# Patient Record
Sex: MALE | Race: WHITE
[De-identification: names, ages, dates, MRNs, and addresses within clinical notes are randomized per-mention and may not be internally consistent; named-entity substitution may affect disease eponyms.]

---

## 2017-12-18 ENCOUNTER — HOSPITAL ENCOUNTER (INPATIENT)
Dept: HOSPITAL 74 - JER | Age: 72
LOS: 1 days | Discharge: HOME | DRG: 776 | End: 2017-12-19
Attending: FAMILY MEDICINE | Admitting: FAMILY MEDICINE
Payer: COMMERCIAL

## 2017-12-18 VITALS — BODY MASS INDEX: 31.5 KG/M2

## 2017-12-18 DIAGNOSIS — F13.230: Primary | ICD-10-CM

## 2017-12-18 DIAGNOSIS — G47.00: ICD-10-CM

## 2017-12-18 DIAGNOSIS — G20: ICD-10-CM

## 2017-12-18 DIAGNOSIS — I10: ICD-10-CM

## 2017-12-18 DIAGNOSIS — F41.9: ICD-10-CM

## 2017-12-18 LAB
ALBUMIN SERPL-MCNC: 3.7 G/DL (ref 3.4–5)
ALP SERPL-CCNC: 62 U/L (ref 45–117)
ALT SERPL-CCNC: 29 U/L (ref 12–78)
ANION GAP SERPL CALC-SCNC: 8 MMOL/L (ref 8–16)
APTT BLD: 32.5 SECONDS (ref 26.9–34.4)
AST SERPL-CCNC: 20 U/L (ref 15–37)
BASOPHILS # BLD: 0.5 % (ref 0–2)
BILIRUB SERPL-MCNC: 0.5 MG/DL (ref 0.2–1)
CALCIUM SERPL-MCNC: 8.4 MG/DL (ref 8.5–10.1)
CO2 SERPL-SCNC: 27 MMOL/L (ref 21–32)
CREAT SERPL-MCNC: 0.9 MG/DL (ref 0.7–1.3)
DEPRECATED RDW RBC AUTO: 13.5 % (ref 11.9–15.9)
EOSINOPHIL # BLD: 3.5 % (ref 0–4.5)
GLUCOSE SERPL-MCNC: 134 MG/DL (ref 74–106)
INR BLD: 1.05 (ref 0.82–1.09)
MCH RBC QN AUTO: 31 PG (ref 25.7–33.7)
MCHC RBC AUTO-ENTMCNC: 33.9 G/DL (ref 32–35.9)
MCV RBC: 91.4 FL (ref 80–96)
NEUTROPHILS # BLD: 43.5 % (ref 42.8–82.8)
PLATELET # BLD AUTO: 199 K/MM3 (ref 134–434)
PMV BLD: 7.4 FL (ref 7.5–11.1)
PROT SERPL-MCNC: 7.2 G/DL (ref 6.4–8.2)
PT PNL PPP: 11.9 SEC (ref 9.98–11.88)
WBC # BLD AUTO: 5.1 K/MM3 (ref 4–10)

## 2017-12-18 NOTE — PDOC
*Physical Exam





- Vital Signs


 Last Vital Signs











Temp Pulse Resp BP Pulse Ox


 


 97.4 F L  58 L  18   151/77   95 


 


 12/18/17 16:47  12/18/17 16:47  12/18/17 16:47  12/18/17 16:47  12/18/17 16:47














ED Treatment Course





- LABORATORY


CBC & Chemistry Diagram: 


 12/18/17 17:00





 12/18/17 17:00





- ADDITIONAL ORDERS


Additional order review: 


 Laboratory  Results











  12/18/17 12/18/17





  17:00 17:00


 


PT with INR   11.90 H


 


INR   1.05


 


PTT (Actin FS)   32.5


 


Sodium  141 


 


Potassium  3.7 


 


Chloride  106 


 


Carbon Dioxide  27 


 


Anion Gap  8 


 


BUN  11 


 


Creatinine  0.9 


 


Creat Clearance w eGFR  > 60 


 


Random Glucose  134 H 


 


Calcium  8.4 L 


 


Total Bilirubin  0.5 


 


AST  20 


 


ALT  29 


 


Alkaline Phosphatase  62 


 


Total Protein  7.2 


 


Albumin  3.7 








 











  12/18/17





  17:00


 


RBC  4.84


 


MCV  91.4


 


MCHC  33.9


 


RDW  13.5


 


MPV  7.4 L


 


Neutrophils %  43.5


 


Lymphocytes %  41.3 H


 


Monocytes %  11.2 H


 


Eosinophils %  3.5


 


Basophils %  0.5














- Medications


Given in the ED: 


ED Medications














Discontinued Medications














Generic Name Dose Route Start Last Admin





  Trade Name Sonuq  PRN Reason Stop Dose Admin


 


Diazepam  5 mg  12/18/17 20:09  12/18/17 20:32





  Valium -  PO  12/18/17 20:10  5 mg





  ONCE ONE   Administration


 


Memantine  10 mg  12/18/17 20:26  12/18/17 20:46





  Namenda -  PO  12/18/17 20:27  10 mg





  ONCE ONE   Administration


 


Risperidone  1 mg  12/18/17 20:26  12/18/17 20:46





  Risperdal -  PO  12/18/17 20:27  1 mg





  ONCE ONE   Administration














Medical Decision Making





- Medical Decision Making





12/18/17 22:20


I independently examined and evaluated this patient in conjunction with Dorie 

mid-level practitioner. I reviewed the case and agree with the mid-level 

practitioner's assessment, diagnosis and disposition.











*DC/Admit/Observation/Transfer


Diagnosis at time of Disposition: 


 Benzodiazepine withdrawal








- Referrals





- Patient Instructions





- Post Discharge Activity

## 2017-12-18 NOTE — PDOC
History of Present Illness





- General


Chief Complaint: Tremors


Stated Complaint: HEAD PAIN/TREMORS/NOT SLEEPING


Time Seen by Provider: 12/18/17 16:44





- History of Present Illness


Initial Comments: 





12/18/17 19:41


CHIEF COMPLAINT: tremor, insomnia





HISTORY OF PRESENT ILLNESS: 73 yo M with hx of anxiety, HTN, Parkinson's 

presents to ED with headache, tremors, and insomnia.  Patient's sons are at 

bedside and state that the patient recently came from [place] and had his 

medications changed by a new PCP.  Patient was previously taking risperidone, 

memantine, bromazepam, and atenolol, but per sons the doctor discontinued the 

bromazepam and atenolol.  Patient complains of headache "like a swelling" to 

the back of his head, and sons report that patient is "seeing things that aren'

t there."  Family reports that other than the visual hallucinations, patient 

has been acting normally and speaking normally. At baseline patient does not 

walk "due to knee problems, he uses crutches and has a wheelchair" per family. 





PAST MEDICAL HISTORY: as per HPI





FAMILY HISTORY: Denies





SOCIAL HISTORY: Denies tobacco, alcohol, illicit drug use. 





SURGICAL HISTORY: Denies





ALLERGIES: No known drug allergies





REVIEW OF SYSTEMS


General/Constitutional: Denies fever or chills. Denies weakness.





HEENT: Denies ear pain or discharge. Denies sore throat.





Cardiovascular: Denies chest pain or shortness of breath.





Respiratory: Denies cough, wheezing, or hemoptysis.





Gastrointestinal: Denies nausea, vomiting, diarrhea.





Genitourinary: Denies dysuria, frequency, or change in urination.





Musculoskeletal: Denies joint or muscle swelling or pain. Denies neck or back 

pain.





Skin and breasts: Denies rash or easy bruising.





Neurologic: Headache. Denies vertigo, loss of consciousness, or loss of 

sensation.





Psychiatric: Visual hallucinations, no auditory hallucinations. History of 

anxiety and insomnia.








PHYSICAL EXAM


General Appearance: Well-appearing, appropriately dressed.  No apparent 

distress.





HEENT: EOMI, PERRLA.  No conjunctival pallor.  No photophobia, scleral icterus.





Respiratory/Chest: Lungs CTAB.  No shortness of breath, chest tenderness, 

respiratory distress, accessory muscle use. No crackles, rales, rhonchi, stridor

, wheezing, dullness





Cardiovascular: RRR. S1, S2.  





Vascular Pulses: Dorsalis-Pedis (R): 2+, Dorsalis-Pedis (L): 2+





Gastrointestinal/Abdominal: Normal bowel sounds.  Abdomen soft, non-distended.  

No tenderness or rebound tenderness. No  organomegaly, pulsatile mass, guarding

, hernia, hepatomegaly, splenomegaly.





Musculoskeletal/Extremities:  Normal inspection. FROM of all extremities, 

normal capillary refill.  Pelvis Stable.  No CVA tenderness. No tenderness to 

extremities, pedal edema, swelling, erythema or deformity.





Integumentary: Appropriate color, dry, warm.  No cyanosis, erythema, jaundice 

or rash





Neurologic: CNs II-XII intact. Fully oriented, alert.  Appropriate mood/affect. 

Motor strength 5/5.  No appreciable EOM palsy, facial droop or sensory deficit. 














Past History





- Past Medical History


Allergies/Adverse Reactions: 


 Allergies











Allergy/AdvReac Type Severity Reaction Status Date / Time


 


No Known Allergies Allergy   Verified 12/18/17 16:51











Home Medications: 


Ambulatory Orders





Memantine HCl 10 mg PO BID 12/18/17 


Risperidone 1 mg PO BID 12/18/17 








Cardiac Disorders: Yes (low heart rate)


CVA: No


COPD: No


DVT: No


Other medical history: Alzheimers dis





- Surgical History


Abdominal Surgery: Yes (hernia repair)





- Suicide/Smoking/Psychosocial Hx


Smoking History: Never smoked


Information on smoking cessation initiated: No


Hx Alcohol Use: No


Drug/Substance Use Hx: No


Substance Use Type: None





*Physical Exam





- Vital Signs


 Last Vital Signs











Temp Pulse Resp BP Pulse Ox


 


 97.4 F L  58 L  18   151/77   95 


 


 12/18/17 16:47  12/18/17 16:47  12/18/17 16:47  12/18/17 16:47  12/18/17 16:47














ED Treatment Course





- LABORATORY


CBC & Chemistry Diagram: 


 12/18/17 17:00





 12/18/17 17:00





- ADDITIONAL ORDERS


Additional order review: 


 Laboratory  Results











  12/18/17 12/18/17





  17:00 17:00


 


PT with INR   11.90 H


 


INR   1.05


 


PTT (Actin FS)   32.5


 


Sodium  141 


 


Potassium  3.7 


 


Chloride  106 


 


Carbon Dioxide  27 


 


Anion Gap  8 


 


BUN  11 


 


Creatinine  0.9 


 


Creat Clearance w eGFR  > 60 


 


Random Glucose  134 H 


 


Calcium  8.4 L 


 


Total Bilirubin  0.5 


 


AST  20 


 


ALT  29 


 


Alkaline Phosphatase  62 


 


Total Protein  7.2 


 


Albumin  3.7 








 











  12/18/17





  17:00


 


RBC  4.84


 


MCV  91.4


 


MCHC  33.9


 


RDW  13.5


 


MPV  7.4 L


 


Neutrophils %  43.5


 


Lymphocytes %  41.3 H


 


Monocytes %  11.2 H


 


Eosinophils %  3.5


 


Basophils %  0.5














Medical Decision Making





- Medical Decision Making





12/18/17 20:08


73 yo M with hx of anxiety, HTN, Parkinson's presents to ED with headache, 

tremors, and insomnia s/p discontinuation of bromazepam. 





Discussed case with ER attending Danna . Will admit for observation overnight 

for withdrawal symptoms. 





-Valium 5 mg


-EKG


12/18/17 20:25


Discussed case with attending hospitalist MD Camacho, will order head CT. 





*DC/Admit/Observation/Transfer


Diagnosis at time of Disposition: 


Benzodiazepine withdrawal


Qualifiers:


 Complication of substance-induced condition: with unspecified complication 

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 

withdrawal, unspecified








- Discharge Dispostion


Admit: Yes





- Referrals





- Patient Instructions





- Post Discharge Activity

## 2017-12-18 NOTE — PDOC
Rapid Medical Evaluation


Time Seen by Provider: 12/18/17 16:44


Medical Evaluation: 





12/18/17 16:49


I have performed a brief in-person evaluation of this patient. 





This patient presents with a chief complaint of:


insomnia, tremors and headache.  States started namenda x 2 weeks '


and is also taking risperdone. Patient is hard of hearing 





Pertinent physical exam findings:


NAD


unlabored breathing


lungs clear bilaterally


heart s1s2


neuro: hard of hearing but follows simple commands 





I have ordered the following:


labs 


This patient will proceed to the ED for further evaluation


12/18/17 21:24








**Discharge Disposition





- Referrals


Referrals: 


STAFF,NOT ON [Primary Care Provider] - 





- Patient Instructions





- Post Discharge Activity

## 2017-12-19 VITALS — TEMPERATURE: 97.8 F | SYSTOLIC BLOOD PRESSURE: 142 MMHG | DIASTOLIC BLOOD PRESSURE: 79 MMHG

## 2017-12-19 VITALS — HEART RATE: 56 BPM

## 2017-12-19 LAB — URINE MARIJUANA THC: NEGATIVE NG/ML

## 2017-12-19 NOTE — DS
Physical Examination


Vital Signs: 


 Vital Signs











Temperature  97.8 F   12/19/17 09:28


 


Pulse Rate  56 L  12/19/17 09:28


 


Respiratory Rate  18   12/19/17 09:28


 


Blood Pressure  142/79   12/19/17 09:28


 


O2 Sat by Pulse Oximetry (%)  96   12/19/17 02:19











Constitutional: Yes: No Distress


Eyes: Yes: WNL


HENT: Yes: WNL


Neck: Yes: WNL


Cardiovascular: Yes: WNL


Respiratory: Yes: WNL


Gastrointestinal: Yes: WNL


Renal/: Yes: WNL


Musculoskeletal: Yes: Muscle Weakness


Extremities: Yes: WNL


Edema: No


Peripheral Pulses WNL: Yes


Integumentary: Yes: WNL


Wound/Incision: Yes: Clean/Dry


Neurological: Yes: Pre-Existing Deficit


...Motor Strength: LLE, RLE


Psychiatric: Yes: Other


Labs: 


 CBC, BMP





 12/18/17 17:00 





 12/18/17 17:00 











Discharge Summary


Reason For Visit: BENZODIAZEPINE WITHDRAWAL


Current Active Problems





Benzodiazepine withdrawal (Acute)


Parkinson disease (Acute)








Procedures: Principal: CT HEAD


Hospital Course: 





ADMITTED FOR OBSERVATION, TREATED WITH IVF, NEUROLOGY WORKUP, CAN F/U AS 

OUTPATIENT, WITHDRAWEL SYMPTOMS RESOLVED


Condition: Fair





- Instructions


Diet, Activity, Other Instructions: 


LOW SODIUM DIET


PT AND NEUROLOGY F/U AS OUTPATIENT


SEE PMD IN 1-2 DAYS





- Home Medications


Comprehensive Discharge Medication List: 


Ambulatory Orders





Memantine HCl 10 mg PO BID 12/18/17 


Risperidone 1 mg PO BID 12/18/17 


Acetaminophen [Tylenol .Regular Strength -] 650 mg PO Q6H PRN  tablet 12/19/17 


Memantine HCl [Namenda -] 10 mg PO BID #60 tablet 12/19/17 


Risperidone [Risperdal -] 1 mg PO BID #60 tablet 12/19/17

## 2017-12-19 NOTE — CONSULT
Consult - text type





- Consultation


Consultation Note: 





 Neurology





History of Present Illness


CHIEF COMPLAINT: tremor, insomnia





HISTORY OF PRESENT ILLNESS: 73 yo M with hx of anxiety, HTN, Parkinson's 

presents to ED with headache, tremors, and insomnia.  Patient's sons were at 

bedside and stated that the patient recently came from [place] and had his 

medications changed by a new PCP.  Patient was previously taking risperidone, 

memantine, bromazepam, and atenolol, but per sons the doctor discontinued the 

bromazepam and atenolol.  Patient complains of headache "like a swelling" to 

the back of his head, and sons report that patient is "seeing things that aren'

t there."  Family reports that other than the visual hallucinations, patient 

has been acting normally and speaking normally. In speaking with the patient he 

is able to tell me that he's in the hospital as well as the date. He is alert 

and interactive and appears to be at baseline. CT head completed and reviewed 

and did not show acute changes. 





PAST MEDICAL HISTORY: as per HPI





FAMILY HISTORY: Denies





SOCIAL HISTORY: Denies tobacco, alcohol, illicit drug use. 





SURGICAL HISTORY: Denies





ALLERGIES: No known drug allergies





REVIEW OF SYSTEMS


General/Constitutional: Denies fever or chills. Denies weakness.





HEENT: Denies ear pain or discharge. Denies sore throat.





Cardiovascular: Denies chest pain or shortness of breath.





Respiratory: Denies cough, wheezing, or hemoptysis.





Gastrointestinal: Denies nausea, vomiting, diarrhea.





Genitourinary: Denies dysuria, frequency, or change in urination.





Musculoskeletal: Denies joint or muscle swelling or pain. Denies neck or back 

pain.





Skin and breasts: Denies rash or easy bruising.





Neurologic: Headache. Denies vertigo, loss of consciousness, or loss of 

sensation.





Psychiatric: Visual hallucinations, no auditory hallucinations. History of 

anxiety and insomnia.














Past History





- Past Medical History


Allergies/Adverse Reactions: 


 Allergies











Allergy/AdvReac Type Severity Reaction Status Date / Time


 


No Known Allergies Allergy   Verified 12/18/17 16:51











Home Medications: 


Ambulatory Orders





Memantine HCl 10 mg PO BID 12/18/17 


Risperidone 1 mg PO BID 12/18/17 








Cardiac Disorders: Yes (low heart rate)


CVA: No


COPD: No


DVT: No


Other medical history: Alzheimers dis





- Surgical History


Abdominal Surgery: Yes (hernia repair)





- Suicide/Smoking/Psychosocial Hx


Smoking History: Never smoked


Information on smoking cessation initiated: No


Hx Alcohol Use: No


Drug/Substance Use Hx: No


Substance Use Type: None





*Physical Exam





 Vital Signs











Temperature  97.8 F   12/19/17 09:28


 


Pulse Rate  56 L  12/19/17 09:28


 


Respiratory Rate  18   12/19/17 09:28


 


Blood Pressure  142/79   12/19/17 09:28


 


O2 Sat by Pulse Oximetry (%)  96   12/19/17 02:19














PHYSICAL EXAM


General Appearance: Well-appearing, appropriately dressed.  No apparent 

distress.





HEENT: EOMI, PERRLA.  No conjunctival pallor.  No photophobia, scleral icterus.





Respiratory/Chest: Lungs CTAB.  No shortness of breath, chest tenderness, 

respiratory distress, accessory muscle use. No crackles, rales, rhonchi, stridor

, wheezing, dullness





Cardiovascular: RRR. S1, S2.  





Vascular Pulses: Dorsalis-Pedis (R): 2+, Dorsalis-Pedis (L): 2+





Gastrointestinal/Abdominal: Normal bowel sounds.  Abdomen soft, non-distended.  

No tenderness or rebound tenderness. No  organomegaly, pulsatile mass, guarding

, hernia, hepatomegaly, splenomegaly.





Musculoskeletal/Extremities:  Normal inspection. FROM of all extremities, 

normal capillary refill.  Pelvis Stable.  No CVA tenderness. No tenderness to 

extremities, pedal edema, swelling, erythema or deformity.





Integumentary: Appropriate color, dry, warm.  No cyanosis, erythema, jaundice 

or rash





Neurologic: CNs II-XII intact. Fully oriented, alert.  Appropriate mood/affect. 

Motor strength 5/5.  No appreciable EOM palsy, facial droop or sensory deficit. 











 Laboratory  Results











  12/18/17 12/18/17





  17:00 17:00


 


PT with INR   11.90 H


 


INR   1.05


 


PTT (Actin FS)   32.5


 


Sodium  141 


 


Potassium  3.7 


 


Chloride  106 


 


Carbon Dioxide  27 


 


Anion Gap  8 


 


BUN  11 


 


Creatinine  0.9 


 


Creat Clearance w eGFR  > 60 


 


Random Glucose  134 H 


 


Calcium  8.4 L 


 


Total Bilirubin  0.5 


 


AST  20 


 


ALT  29 


 


Alkaline Phosphatase  62 


 


Total Protein  7.2 


 


Albumin  3.7 








 











  12/18/17





  17:00


 


RBC  4.84


 


MCV  91.4


 


MCHC  33.9


 


RDW  13.5


 


MPV  7.4 L


 


Neutrophils %  43.5


 


Lymphocytes %  41.3 H


 


Monocytes %  11.2 H


 


Eosinophils %  3.5


 


Basophils %  0.5











CT head reviewed





Medical Decision Making


73 yo M with hx of anxiety, HTN, Parkinson's presents to ED with headache, 

tremors, and insomnia.  Patient's sons were at bedside and stated that the 

patient recently came from [place] and had his medications changed by a new PCP.

  Patient was previously taking risperidone, memantine, bromazepam, and atenolol

, but per sons the doctor discontinued the bromazepam and atenolol.  Patient 

complains of headache "like a swelling" to the back of his head, and sons 

report that patient is "seeing things that aren't there."  Family reports that 

other than the visual hallucinations, patient has been acting normally and 

speaking normally. In speaking with the patient he is able to tell me that he's 

in the hospital as well as the date. He is alert and interactive and appears to 

be at baseline. CT head completed and reviewed and did not show acute changes. 


Consider psych consult for anxiety and for hallucinations


Can continue memantine as they find it helpful though does seem to have 

baseline mental status knowing person, place, time


Can give tylenol for tension headache


Monitor blood pressure, maintain < 140/90


Can continue atenolol


Maintain hydration and adequate PO intake

## 2017-12-20 NOTE — EKG
Test Reason : 

Blood Pressure : ***/*** mmHG

Vent. Rate : 054 BPM     Atrial Rate : 054 BPM

   P-R Int : 158 ms          QRS Dur : 096 ms

    QT Int : 456 ms       P-R-T Axes : 032 001 035 degrees

   QTc Int : 432 ms

 

SINUS BRADYCARDIA WITH PREMATURE ATRIAL COMPLEXES

OTHERWISE NORMAL ECG

NO PREVIOUS ECGS AVAILABLE

Confirmed by JASWINDER MOBLEY, ESTEFANI ([id number]) on 12/20/2017 11:20:33 AM

 

Referred By:             Confirmed By:ESTEFANI SAN MD

## 2018-04-13 ENCOUNTER — HOSPITAL ENCOUNTER (INPATIENT)
Dept: HOSPITAL 74 - JER | Age: 73
LOS: 4 days | Discharge: HOME | DRG: 720 | End: 2018-04-17
Attending: FAMILY MEDICINE | Admitting: INTERNAL MEDICINE
Payer: COMMERCIAL

## 2018-04-13 VITALS — BODY MASS INDEX: 32 KG/M2

## 2018-04-13 DIAGNOSIS — R79.89: ICD-10-CM

## 2018-04-13 DIAGNOSIS — D72.819: ICD-10-CM

## 2018-04-13 DIAGNOSIS — F02.80: ICD-10-CM

## 2018-04-13 DIAGNOSIS — I10: ICD-10-CM

## 2018-04-13 DIAGNOSIS — G20: ICD-10-CM

## 2018-04-13 DIAGNOSIS — A41.9: Primary | ICD-10-CM

## 2018-04-13 DIAGNOSIS — J18.9: ICD-10-CM

## 2018-04-13 DIAGNOSIS — J98.11: ICD-10-CM

## 2018-04-13 DIAGNOSIS — R00.0: ICD-10-CM

## 2018-04-13 DIAGNOSIS — F41.9: ICD-10-CM

## 2018-04-13 PROCEDURE — G0378 HOSPITAL OBSERVATION PER HR: HCPCS

## 2018-04-14 LAB
ACANTHOCYTES BLD QL SMEAR: 0
ALBUMIN SERPL-MCNC: 4.1 G/DL (ref 3.4–5)
ALP SERPL-CCNC: 97 U/L (ref 45–117)
ALT SERPL-CCNC: 46 U/L (ref 12–78)
ANION GAP SERPL CALC-SCNC: 11 MMOL/L (ref 8–16)
ANISOCYTOSIS BLD QL: 0
APPEARANCE UR: CLEAR
AST SERPL-CCNC: 71 U/L (ref 15–37)
BASO STIPL BLD QL SMEAR: 0
BILIRUB SERPL-MCNC: 0.6 MG/DL (ref 0.2–1)
BILIRUB UR STRIP.AUTO-MCNC: NEGATIVE MG/DL
BUN SERPL-MCNC: 12 MG/DL (ref 7–18)
CALCIUM SERPL-MCNC: 9.2 MG/DL (ref 8.5–10.1)
CHLORIDE SERPL-SCNC: 102 MMOL/L (ref 98–107)
CO2 SERPL-SCNC: 24 MMOL/L (ref 21–32)
COLOR UR: (no result)
CREAT SERPL-MCNC: 1.1 MG/DL (ref 0.7–1.3)
DACRYOCYTES BLD QL SMEAR: 0
DEPRECATED RDW RBC AUTO: 13.9 % (ref 11.9–15.9)
DOHLE BOD BLD QL SMEAR: 0
GLUCOSE SERPL-MCNC: 145 MG/DL (ref 74–106)
HCT VFR BLD CALC: 42.8 % (ref 35.4–49)
HELMET CELLS BLD QL SMEAR: 0
HGB BLD-MCNC: 14.6 GM/DL (ref 11.7–16.9)
HOWELL-JOLLY BOD BLD QL SMEAR: 0
KETONES UR QL STRIP: NEGATIVE
LEUKOCYTE ESTERASE UR QL STRIP.AUTO: NEGATIVE
MACROCYTES BLD QL: 0
MCH RBC QN AUTO: 30.5 PG (ref 25.7–33.7)
MCHC RBC AUTO-ENTMCNC: 34.1 G/DL (ref 32–35.9)
MCV RBC: 89.4 FL (ref 80–96)
NITRITE UR QL STRIP: NEGATIVE
OVALOCYTES BLD QL SMEAR: 0
PH UR: 5 [PH] (ref 5–8)
PLATELET # BLD AUTO: 188 K/MM3 (ref 134–434)
PLATELET BLD QL SMEAR: NORMAL
PMV BLD: 7.3 FL (ref 7.5–11.1)
POTASSIUM SERPLBLD-SCNC: 4.2 MMOL/L (ref 3.5–5.1)
PROT SERPL-MCNC: 7.8 G/DL (ref 6.4–8.2)
PROT UR QL STRIP: NEGATIVE
PROT UR QL STRIP: NEGATIVE
RBC # BLD AUTO: 4.79 M/MM3 (ref 4–5.6)
RBC # UR STRIP: NEGATIVE /UL
ROULEAUX BLD QL SMEAR: 0
SICKLE CELLS BLD QL SMEAR: 0
SODIUM SERPL-SCNC: 137 MMOL/L (ref 136–145)
SP GR UR: 1.03 (ref 1–1.03)
TARGETS BLD QL SMEAR: 0
TOXIC GRANULES BLD QL SMEAR: 0
UROBILINOGEN UR STRIP-MCNC: NEGATIVE MG/DL (ref 0.2–1)
WBC # BLD AUTO: 5.2 K/MM3 (ref 4–10)

## 2018-04-14 RX ADMIN — RISPERIDONE SCH MG: 1 TABLET, FILM COATED ORAL at 21:18

## 2018-04-14 RX ADMIN — HEPARIN SODIUM SCH UNIT: 5000 INJECTION, SOLUTION INTRAVENOUS; SUBCUTANEOUS at 10:54

## 2018-04-14 RX ADMIN — RISPERIDONE SCH MG: 1 TABLET, FILM COATED ORAL at 10:54

## 2018-04-14 RX ADMIN — MEMANTINE SCH MG: 10 TABLET ORAL at 21:18

## 2018-04-14 RX ADMIN — MEMANTINE SCH MG: 10 TABLET ORAL at 10:54

## 2018-04-14 RX ADMIN — HEPARIN SODIUM SCH UNIT: 5000 INJECTION, SOLUTION INTRAVENOUS; SUBCUTANEOUS at 21:18

## 2018-04-14 NOTE — HP
CHIEF COMPLAINT: Fever, Chills





PCP: Dr. Stefany Dixon





HISTORY OF PRESENT ILLNESS:


This is a 72 y/o man with a PMH of Dementia, Parkinson's, Hypertension, 

Anxiety. Who presents to the ED with fever, chills, rigors x 4pm yesterday. 

Patient has Dementia, unable to obtain HPI. Patient's daughter was at bedside, 

 used. The daughter reports the patient was shaking uncontrollably 

and he felt hot.


The patient denies cough, dizziness, HA, SOB, CP, N/V/D, constipation, dysuria.





ER course was notable for:


(1) Sepsis criteria met: T 103, P 118, R 22


(2) Chest Xray image: KAREN Pneumonia


(3)





Recent Travel: None





PAST MEDICAL HISTORY:


See HPI





PAST SURGICAL HISTORY:


B/L knee replacements (1/2018)





Social History:


Smoking: Never


Alcohol:  None


Drugs:    None


Lives with daughter





Family History:


Non-contributory





Allergies





No Known Allergies Allergy (Verified 04/13/18 23:42)


 








HOME MEDICATIONS:


 Home Medications











 Medication  Instructions  Recorded


 


Memantine HCl [Namenda -] 10 mg PO BID #60 tablet 12/19/17


 


Risperidone [Risperdal -] 1 mg PO BID #60 tablet 12/19/17








REVIEW OF SYSTEMS


CONSTITUTIONAL: fever, chills, loss of appetite 


Absent:  diaphoresis, generalized weakness, malaise, weight change


HEENT: 


Absent:  rhinorrhea, nasal congestion, throat pain, throat swelling, difficulty 

swallowing, mouth swelling, ear pain, eye pain, visual changes


CARDIOVASCULAR: 


Absent: chest pain, syncope, palpitations, irregular heart rate, lightheadedness

, peripheral edema


RESPIRATORY: 


Absent: cough, shortness of breath, dyspnea with exertion, orthopnea, wheezing, 

stridor, hemoptysis


GASTROINTESTINAL:


Absent: abdominal pain, abdominal distension, nausea, vomiting, diarrhea, 

constipation, melena, hematochezia


GENITOURINARY: 


Absent: dysuria, frequency, urgency, hesitancy, hematuria, flank pain, genital 

pain


MUSCULOSKELETAL: 


Absent: myalgia, arthralgia, joint swelling, back pain, neck pain


SKIN: 


Absent: rash, itching, pallor


HEMATOLOGIC/IMMUNOLOGIC: 


Absent: easy bleeding, easy bruising, lymphadenopathy, frequent infections


ENDOCRINE:


Absent: unexplained weight gain, unexplained weight loss, heat intolerance, 

cold intolerance


NEUROLOGIC: 


Absent: headache, focal weakness or paresthesias, dizziness, unsteady gait, 

seizure, mental status changes, bladder or bowel incontinence


PSYCHIATRIC: 


Absent: anxiety, depression, suicidal or homicidal ideation, hallucinations.








PHYSICAL EXAMINATION


 Vital Signs - 24 hr











  04/13/18 04/14/18





  23:42 01:23


 


Temperature  103.0 F H


 


Pulse Rate 116 H 


 


Respiratory 22 





Rate  


 


Blood Pressure 118/66 


 


O2 Sat by Pulse 96 





Oximetry (%)  











GENERAL: Awake- at baseline, in no acute distress.


HEAD: Normal with no signs of trauma.


EYES: Pupils equal, round and reactive to light, extraocular movements intact, 

sclera anicteric, conjunctiva clear. No lid lag.


EARS, NOSE, THROAT: Ears normal, nares patent, oropharynx clear without 

exudates. Dry mucous membranes.


NECK: Normal range of motion, supple without lymphadenopathy, JVD, or masses.


LUNGS: Breath sounds diminished to L-base. No wheezes, and no crackles. No 

accessory muscle use.


HEART: Regular rate and rhythm, normal S1 and S2 without murmur, rub or gallop.


ABDOMEN: Soft, nontender, not distended, normoactive bowel sounds, no guarding, 

no rebound, no masses.  No hepatomegaly or  splenomegaly. 


MUSCULOSKELETAL: Normal range of motion at all joints. No bony deformities or 

tenderness. No CVA tenderness.


UPPER EXTREMITIES: 2+ pulses, warm, well-perfused. No cyanosis. No clubbing. No 

peripheral edema.


LOWER EXTREMITIES: 2+ pulses, warm, well-perfused. No calf tenderness. No 

peripheral edema. 


NEUROLOGICAL:  Cranial nerves II-XII intact. Slowed speech. Gait not observed.


PSYCHIATRIC: Cooperative. Good eye contact. Appropriate mood and affect.


SKIN: Warm, dry, normal turgor, no rashes or lesions noted, normal capillary 

refill. 





 Laboratory Results - last 24 hr











  04/14/18 04/14/18 04/14/18





  01:05 01:05 01:05


 


WBC  5.2  


 


RBC  4.79  


 


Hgb  14.6  


 


Hct  42.8  


 


MCV  89.4  


 


MCH  30.5  


 


MCHC  34.1  


 


RDW  13.9  


 


Plt Count  188  


 


MPV  7.3 L  


 


Neutrophils %  No Result Required.  


 


Neutrophils % (Manual)  68.7  


 


Band Neutrophils %  4.0  


 


Lymphocytes %  No Result Required.  


 


Lymphocytes % (Manual)  10.1  


 


Monocytes % (Manual)  10  


 


Eosinophils % (Manual)  0.0  


 


Basophils % (Manual)  1.0  


 


Myelocytes % (Man)  0  


 


Promyelocytes % (Man)  0  


 


Blast Cells % (Manual)  0  


 


Nucleated RBC %  0  


 


Metamyelocytes  0  


 


Hypochromia  0  


 


Toxic Granulation  0  


 


Dohle Bodies  0  


 


Platelet Estimate  Normal  


 


Polychromasia  0  


 


Poikilocytosis  0  


 


Basophilic Stippling  0  


 


Anisocytosis  0  


 


Microcytosis  0  


 


Macrocytosis  0  


 


Spherocytes  0  


 


Sickle Cells  0  


 


Target Cells  0  


 


Tear Drop Cells  0  


 


Ovalocytes  0  


 


Stomatocytes  0  


 


Helmet Cells  0  


 


Mckee-Fincastle Bodies  0  


 


Cabot Rings  0  


 


[place] Cells  0  


 


Acanthocytes (Spur)  0  


 


Rouleaux  0  


 


Fragmented RBCs  0  


 


Schistocytes  0  


 


D-Dimer   1962 H 


 


Sodium    137


 


Potassium    4.2


 


Chloride    102


 


Carbon Dioxide    24


 


Anion Gap    11


 


BUN    12


 


Creatinine    1.1  D


 


Creat Clearance w eGFR    > 60


 


Random Glucose    145 H


 


Lactic Acid   


 


Calcium    9.2


 


Total Bilirubin    0.6


 


AST    71 H D


 


ALT    46  D


 


Alkaline Phosphatase    97  D


 


Creatine Kinase   


 


Troponin I   


 


Total Protein    7.8


 


Albumin    4.1














  04/14/18 04/14/18





  01:05 04:30


 


WBC  


 


RBC  


 


Hgb  


 


Hct  


 


MCV  


 


MCH  


 


MCHC  


 


RDW  


 


Plt Count  


 


MPV  


 


Neutrophils %  


 


Neutrophils % (Manual)  


 


Band Neutrophils %  


 


Lymphocytes %  


 


Lymphocytes % (Manual)  


 


Monocytes % (Manual)  


 


Eosinophils % (Manual)  


 


Basophils % (Manual)  


 


Myelocytes % (Man)  


 


Promyelocytes % (Man)  


 


Blast Cells % (Manual)  


 


Nucleated RBC %  


 


Metamyelocytes  


 


Hypochromia  


 


Toxic Granulation  


 


Dohle Bodies  


 


Platelet Estimate  


 


Polychromasia  


 


Poikilocytosis  


 


Basophilic Stippling  


 


Anisocytosis  


 


Microcytosis  


 


Macrocytosis  


 


Spherocytes  


 


Sickle Cells  


 


Target Cells  


 


Tear Drop Cells  


 


Ovalocytes  


 


Stomatocytes  


 


Helmet Cells  


 


Mckee-Fincastle Bodies  


 


Cabot Rings  


 


Beatriz Cells  


 


Acanthocytes (Spur)  


 


Rouleaux  


 


Fragmented RBCs  


 


Schistocytes  


 


D-Dimer  


 


Sodium  


 


Potassium  


 


Chloride  


 


Carbon Dioxide  


 


Anion Gap  


 


BUN  


 


Creatinine  


 


Creat Clearance w eGFR  


 


Random Glucose  


 


Lactic Acid   1.6


 


Calcium  


 


Total Bilirubin  


 


AST  


 


ALT  


 


Alkaline Phosphatase  


 


Creatine Kinase  43 


 


Troponin I  < 0.02 


 


Total Protein  


 


Albumin  











ASSESSMENT/PLAN:


This is a 72 y/o man with a PMHx of: Dementia, Parkinson's, HTN, Anxiety. 

Placed in Observation for Sepsis secondary to Community Acquired Pneumonia.





Plan:


1. Sepsis- Likely secondary to CAP, CURB65 Score 1, Chest Xray image- KAREN 

Infiltrate, report pending, Blood Cultures-pending, Urine Legionella, no 

Leukocytosis, no Neutrophilia, LA-wnl, Monitor CBC, Monitor vitals. Azithromycin

, Ceftriaxone given in ED, will continue, Appreciate ID consult


2. Pneumonia- see above


3. Elevated D- dimer- Likely secondary to Sepsis vs Wells Score 1.5, PE less 

likely, P 118-72 post fluid bolus, Tylenol, would consider CTA if P remains 100s

, or complaint SOB. currently patient denies SOB, P 72.


4. Hypertension- stable, continue home med, maintain MAP> 60, monitor BP


5. Parkinson's- continue home meds, fall precautions


6. Psych: Dementia, Anxiety- continue home meds, fall precautions 


7. FEN- replete lytes prn, Low Na Diet


7. DVT ppx- SCDs, Consider ACs if LOS > 48 hrs





Code Status: Full Code, HCP- Sandhya Resendiz (daughter)





Problem List





- Problem


(1) Sepsis


Code(s): A41.9 - SEPSIS, UNSPECIFIED ORGANISM   


Qualifiers: 


   Sepsis type: sepsis due to unspecified organism   Qualified Code(s): A41.9 - 

Sepsis, unspecified organism   





(2) Dementia


Code(s): F03.90 - UNSPECIFIED DEMENTIA WITHOUT BEHAVIORAL DISTURBANCE   





(3) Parkinson disease


Code(s): G20 - PARKINSON'S DISEASE   





(4) DVT prophylaxis


Code(s): QJL9711 -    





Visit type





- Emergency Visit


Emergency Visit: Yes


ED Registration Date: 04/14/18


Care time: The patient presented to the Emergency Department on the above date 

and was hospitalized for further evaluation of their emergent condition.





- New Patient


This patient is new to me today: Yes


Date on this admission: 04/14/18





- Critical Care


Critical Care patient: No





Hospitalist Screening





- Colonoscopy Questionnaire


Colonoscopy Questionnaire: 





Colonoscopy Questionnaire








-   Patient:


50 - 75 years old and never had a screening colonoscopy: Unknown


History of colon or rectal polyps, or CA: Unknown


History of IBD, Crohn's disease or UC: Unknown


History of abdominal radiation therapy as a child: Unknown





-   Relative:


1 with colon or rectal CA, or polyps at age 60 or younger: Unknown


Colon or rectal CA diagnosed at age 45 or younger: Unknown


Multiple relatives with colon or rectal CA: Unknown





-   Outcome:


Screening Result: Negative Screen

## 2018-04-14 NOTE — PN
Progress Note (short form)





- Note


Progress Note: 





ID consult dictated





imp/reccd





CAP- LLL infiltrate


influenza screen negative


cultures sent


send legionella antigen


continue rocephin/sejal











Problem List





- Problems


(1) Pneumonia


Code(s): J18.9 - PNEUMONIA, UNSPECIFIED ORGANISM

## 2018-04-14 NOTE — PDOC
History of Present Illness





- General


Chief Complaint: Cold Symptoms


Stated Complaint: FEVER


Time Seen by Provider: 04/14/18 00:42





- History of Present Illness


Initial Comments: 





04/14/18 00:42


Mr. Anshu Beckford is a 74 yo male w/ pmh of anxiety, HTN, and Parkinson's who 

presents for evaluation of an episode of shaking, palpitations, and shortness 

of breath. Per family who is with him he is usually ambulatory and can walk on 

his own but has been weak since this began. He had previously been in his 

normal state of health until this episode started suddenly at 4pm this evening.





The patient denies chest pain, headache and dizziness. Denies fever, chills, 

nausea, vomit, diarrhea and constipation. Denies dysuria, frequency, urgency 

and hematuria. 





Allergies: NKDA





Past History





- Past Medical History


Allergies/Adverse Reactions: 


 Allergies











Allergy/AdvReac Type Severity Reaction Status Date / Time


 


No Known Allergies Allergy   Verified 04/13/18 23:42











Home Medications: 


Ambulatory Orders





Memantine HCl [Namenda -] 10 mg PO BID #60 tablet 12/19/17 


Risperidone [Risperdal -] 1 mg PO BID #60 tablet 12/19/17 








Cardiac Disorders: Yes (low heart rate)


CVA: No


COPD: No


DVT: No


Dementia: Yes


HTN: Yes





- Surgical History


Abdominal Surgery: Yes (hernia repair)





- Suicide/Smoking/Psychosocial Hx


Smoking History: Never smoked


Have you smoked in the past 12 months: No


Information on smoking cessation initiated: No


Hx Alcohol Use: No


Drug/Substance Use Hx: No


Substance Use Type: None





**Review of Systems





- Review of Systems


Comments:: 





04/14/18 00:42


GENERAL/CONSTITUTIONAL: +Generalized weakness. No fever or chills. 


HEAD, EYES, EARS, NOSE AND THROAT: No change in vision. No ear pain or 

discharge. No sore throat.


CARDIOVASCULAR: +Palpitations since 4pm. No chest pain or shortness of breath


RESPIRATORY: +Reported "gasping for air" per family earlier today. No cough, 

wheezing, or hemoptysis.


GASTROINTESTINAL: No nausea, vomiting, diarrhea or constipation.


GENITOURINARY: No dysuria, frequency, or change in urination.


MUSCULOSKELETAL: No joint or muscle swelling or pain. No neck or back pain.


SKIN: No rash


NEUROLOGIC: No headache, vertigo, loss of consciousness, or change in strength/

sensation.


ENDOCRINE: No increased thirst. No abnormal weight change


HEMATOLOGIC/LYMPHATIC: No anemia, easy bleeding, or history of blood clots.


ALLERGIC/IMMUNOLOGIC: No hives or skin allergy.





*Physical Exam





- Vital Signs


 Last Vital Signs











Temp Pulse Resp BP Pulse Ox


 


    116 H  22   118/66   96 


 


    04/13/18 23:42  04/13/18 23:42  04/13/18 23:42  04/13/18 23:42














- Physical Exam


Comments: 





04/14/18 00:43


GENERAL: Awake, alert, and oriented to baseline, in no acute distress


HEAD: No signs of trauma, normocephalic, atraumatic 


EYES: PERRLA, EOMI, sclera anicteric, conjunctiva clear


ENT: Auricles normal inspection, hearing grossly normal, nares patent, 

oropharynx clear without


exudates. Moist mucosa


NECK: Normal ROM, supple, no lymphadenopathy, JVD, or masses


LUNGS: No distress, speaks full sentences, clear to auscultation bilaterally 


HEART: Regular rate and rhythm, normal S1 and S2, no murmurs, rubs or gallops, 

peripheral pulses normal and equal bilaterally. 


ABDOMEN: Soft, nontender, normoactive bowel sounds. No guarding, no rebound. No 

masses


EXTREMITIES: Normal inspection, Normal range of motion, no edema. No clubbing 

or cyanosis. 


NEUROLOGICAL: Cranial nerves II through XII grossly intact. Normal speech, 

normal gait, no focal sensorimotor deficits 


SKIN: Warm, Dry, normal turgor, no rashes or lesions noted. 





ED Treatment Course





- LABORATORY


CBC & Chemistry Diagram: 


 04/14/18 01:05





 04/14/18 01:05





Medical Decision Making





- Medical Decision Making





04/14/18 03:51


Mr. Brasher is a 74 yo male w/ pmh as described who presents for evaluation of 

new onset SOB. CBC/CMP/Blood Cx/Urine Cx/UA/EKG/CXR sent for evaluation.





04/14/18 03:55


Patient noted to have Left lower lobe pneumonia on CXR. Treating with 

azithromycin and ceftriaxone. Admitting for sepsis in the setting of pneumonia. 





*DC/Admit/Observation/Transfer


Diagnosis at time of Disposition: 


Sepsis


Qualifiers:


 Sepsis type: sepsis due to unspecified organism Qualified Code(s): A41.9 - 

Sepsis, unspecified organism








- Discharge Dispostion


Admit: Yes





- Referrals


Referrals: 


Stefany Dixon [Primary Care Provider] - 





- Patient Instructions





- Post Discharge Activity

## 2018-04-14 NOTE — PDOC
Attending Attestation





- Resident


Resident Name: Malcolm Appiah





- ED Attending Attestation


I have performed the following: I have examined & evaluated the patient, The 

case was reviewed & discussed with the resident, I agree w/resident's findings 

& plan, Exceptions are as noted





- HPI


HPI: 





04/14/18 01:14


73 M with h/o dementia, HTN, anxiety presents to ED with fevers, chills, and 

rigors. Pt's family states that he began to feel feverish at around 4PM. They 

did not measure a temp but state he felt warm. He subsequently began to rigor 

and have chills. Pt denies cough, denies N/V/D/abdominal pain. Denies dysuria. 

Denies flank pain.





- Physicial Exam


PE: 





04/14/18 01:15


"GENERAL: Awake, alert, and fully oriented, in no acute distress.


HEAD: No signs of trauma


EYES: PERRLA, EOMI, sclera anicteric, conjunctiva clear


ENT: Auricles normal inspection, hearing grossly normal, nares patent, 

oropharynx clear without exudates. Moist mucosa


NECK: Nontender, no stepoffs, Normal ROM, supple, no lymphadenopathy, JVD, or 

masses


LUNGS: Breath sounds equal, clear to auscultation bilaterally.  No wheezes, and 

no crackles


HEART: Regular rate and rhythm, normal S1 and S2, no murmurs, rubs or gallops


ABDOMEN: Soft, nontender, normoactive bowel sounds.  No guarding, no rebound.  

No masses


EXTREMITIES: Normal range of motion, no edema.  No clubbing or cyanosis. No 

cords, erythema, or tenderness


NEUROLOGICAL: Cranial nerves II through XII intact. 5/5 strength and sensation 

in all extremities, Normal speech, normal gait, normal cerebellar function


SKIN: Warm, Dry, normal turgor, no rashes or lesions noted.


"





- Medical Decision Making





04/14/18 01:15


73 M with fevers, chills, rigors x 1 day. Vitals notable for fever and 

tachycardia. Pt with no focal signs of infectious process at this time.


- Labs, cultures


- CXR, UA


- Flu swab


- IVF, tylenol





04/14/18 03:57


Labs wnl.


Prelim CXR read shows LLL PNA.


Pt covered for CAP with ceftriaxone + azithro


Will admit to obs

## 2018-04-14 NOTE — PN
Progress Note, Physician





- Current Medication List


Current Medications: 


Active Medications





Acetaminophen (Tylenol -)  650 mg PO Q6H PRN


   PRN Reason: PAIN OR FEVER


Heparin Sodium (Porcine) (Heparin -)  5,000 unit SQ BID [facility]


   Last Admin: 04/14/18 10:54 Dose:  5,000 unit


Azithromycin 500 mg/ Dextrose  250 mls @ 250 mls/hr IVPB DAILY [facility]


Ceftriaxone Sodium 1 gm/ (Dextrose)  50 mls @ 100 mls/hr IVPB DAILY [facility]


Memantine (Namenda -)  10 mg PO BID [facility]


   Last Admin: 04/14/18 10:54 Dose:  10 mg


Risperidone (Risperdal -)  1 mg PO BID [facility]


   Last Admin: 04/14/18 10:54 Dose:  1 mg











- Objective


Vital Signs: 


 Vital Signs











Temperature  99.3 F   04/14/18 10:00


 


Pulse Rate  73   04/14/18 08:03


 


Respiratory Rate  20   04/14/18 08:03


 


Blood Pressure  115/63   04/14/18 08:03


 


O2 Sat by Pulse Oximetry (%)  95   04/14/18 08:03











Labs: 


 CBC, BMP





 04/14/18 01:05 





 04/14/18 01:05 











Problem List





- Problems


(1) Sepsis


Assessment/Plan: 


- Likely secondary to CAP, CURB65 Score 1, Chest Xray image- KAREN Infiltrate, 

report pending, Blood Cultures-pending, Urine Legionella, no Leukocytosis, no 

Neutrophilia, LA-wnl, Monitor CBC, Monitor vitals. Azithromycin, Ceftriaxone 

will continue, 


-Appreciate ID consult





Code(s): A41.9 - SEPSIS, UNSPECIFIED ORGANISM   


Qualifiers: 


   Sepsis type: sepsis due to unspecified organism   Qualified Code(s): A41.9 - 

Sepsis, unspecified organism   





(2) Pneumonia


Assessment/Plan: 


as above


Code(s): J18.9 - PNEUMONIA, UNSPECIFIED ORGANISM   





(3) Dementia


Assessment/Plan: 





6. Psych: Dementia, Anxiety- continue home meds, fall precautions 


7. FEN- replete lytes prn, Low Na Diet


7. DVT ppx- SCDs, Consider ACs if LOS > 48 hrs





Code Status: Full Code, HCP- Sandhya Resendiz (daughter)





Code(s): F03.90 - UNSPECIFIED DEMENTIA WITHOUT BEHAVIORAL DISTURBANCE   





(4) Parkinson disease


Assessment/Plan: 





- continue home meds, fall precautions


Code(s): G20 - PARKINSON'S DISEASE   





(5) Elevated d-dimer


Assessment/Plan: 


- Likely secondary to Sepsis vs Wells Score 1.5, PE less likely, P 118-72 post 

fluid bolus, Tylenol, would consider CTA if P remains 100s, or complaint SOB. 

currently patient denies SOB, P 72.


-Pulm Consult


Code(s): R79.89 - OTHER SPECIFIED ABNORMAL FINDINGS OF BLOOD CHEMISTRY

## 2018-04-14 NOTE — CON.PULM
Consult


Consult Specialty:: PULM/CCM 


Referred by:: DAYNE


Reason for Consultation:: elevated D-dimer





- History of Present Illness


Chief Complaint: fever


History of Present Illness: 


73 M, anxiety, HTN, dementia, and Parkinson's.  Admitted via the ER due to 

chills, shaking, and shortness of breath.  Per the record, the family reports 

ta he has been weak and up until a day or 2 ago. 





No apparent travel history. 





No reported sick contacts.





No reported hemoptysis. 





CXR: LLL infiltrate/atelectasis 











- History Source


History Provided By: Medical Record


Limitations to Obtaining History: Dementia





- Alcohol/Substance Use


Hx Alcohol Use: No





- Smoking History


Smoking history: Never smoked


Have you smoked in the past 12 months: No





Home Medications





- Allergies


Allergies/Adverse Reactions: 


 Allergies











Allergy/AdvReac Type Severity Reaction Status Date / Time


 


No Known Allergies Allergy   Verified 04/13/18 23:42














- Home Medications


Home Medications: 


Ambulatory Orders





Memantine HCl [Namenda -] 10 mg PO BID #60 tablet 12/19/17 


Risperidone [Risperdal -] 1 mg PO BID #60 tablet 12/19/17 











Review of Systems


Unable to obtain ROS, reason: not able to provide 





Physical Exam


Vital Sings: 


 Vital Signs











Temperature  99.3 F   04/14/18 10:00


 


Pulse Rate  73   04/14/18 08:03


 


Respiratory Rate  20   04/14/18 08:03


 


Blood Pressure  115/63   04/14/18 08:03


 


O2 Sat by Pulse Oximetry (%)  95   04/14/18 08:03











Constitutional: Yes: No Distress, Calm


Eyes: Yes: Conjunctiva Clear, EOM Intact


HENT: Yes: Atraumatic, Normocephalic, Other


Neck: Yes: Supple


Cardiovascular: Yes: Regular Rate and Rhythm


Respiratory: Yes: Cough, Diminished, Rhonchi.  No: Accessory Muscle Use, On 

Nasal O2, Rales, SOB, SOB on Exertion, Stridor, Tachypnea, Wheezes


...Inspection: Yes: WNL


...Clubbing: No


Gastrointestinal: Yes: Normal Bowel Sounds, Soft


Renal/: Yes: WNL


Breast(s): Yes: WNL


Musculoskeletal: Yes: WNL


Edema: No


Peripheral Pulses WNL: Yes


Integumentary: Yes: WNL


Neurological: Yes: Confusion


...Motor Strength: WNL


Psychiatric: Yes: Alert


Labs: 


 CBC, BMP





 04/14/18 01:05 





 04/14/18 01:05 











Imaging





- Results


Chest X-ray: Report Reviewed, Image Reviewed





Problem List





- Problems


(1) Dementia


Code(s): F03.90 - UNSPECIFIED DEMENTIA WITHOUT BEHAVIORAL DISTURBANCE   





(2) Elevated d-dimer


Code(s): R79.89 - OTHER SPECIFIED ABNORMAL FINDINGS OF BLOOD CHEMISTRY   





(3) Pneumonia


Code(s): J18.9 - PNEUMONIA, UNSPECIFIED ORGANISM   





(4) Sepsis


Code(s): A41.9 - SEPSIS, UNSPECIFIED ORGANISM   


Qualifiers: 


   Sepsis type: sepsis due to unspecified organism   Qualified Code(s): A41.9 - 

Sepsis, unspecified organism   





(5) Parkinson disease


Code(s): G20 - PARKINSON'S DISEASE   





Assessment/Plan


Elevated D-Dimer due to Sepsis due to LLL CAP 


Wells score 1, Diagnosis of VTE highly unlikely.  Patient is not tachycardic, 

he is comfortable on RA and not hypoxic.  Will not pursue further workup.


Follow cultures 


Agree with ABX coverage 


VTE prophylaxis 


Will follow 





Dr Guzman

## 2018-04-14 NOTE — CONS
INFECTIOUS DISEASE CONSULTATION

 

DATE OF CONSULTATION:  04/14/2018

 

This is a 73-year-old man.  He lives in the community.  He had sudden onset of

chills, shortness of breath.  He reports cough as well that started at 4 p.m.

yesterday.  After that, he began feeling very weak and unable to walk on his own. 

The family brought him to the emergency room.  There is no chest pain, headache, or

dizziness.  He had no vomiting.  There has been no diarrhea or dysuria.

 

ALLERGIES:  He has no known drug allergies.

 

PAST MEDICAL HISTORY:  Notable for mild dementia, anxiety, hypertension, and

Parkinson disease.

 

SURGICAL HISTORY:  Notable for hernia repair.  He has had bilateral knee replacement.

 

MEDICATIONS AT HOME:  Include Namenda and Risperdal.

 

FAMILY HISTORY:  Noncontributory.

 

SOCIAL HISTORY:  There is no history of any cigarette or substance use.  He lives in

the community.  He lives with his daughter.

 

REVIEW OF SYSTEMS:  As per HPI.  There has been no sore throat.  He has just started

having some cough, and there is no diarrhea or dysuria.

 

PHYSICAL EXAMINATION:

Vital Signs:  Current temperature is 100.3.  T-max was 103 on arrival in the

emergency room, with a pulse of 116, blood pressure 118/66, respiratory rate is 22,

saturating 96% on room air.

HEENT:  He is normocephalic.  His eyes are anicteric.  He has no thrush or

pharyngitis.

Neck:  Supple.  There are no meningeal signs.

Lungs:  There are a few crackles, diminished breath sounds at the left base.

Heart:  Regular rate and rhythm.

Abdomen:  Soft, nontender.  He has no CVA or suprapubic tenderness.

Extremities:  Without edema.

 

DIAGNOSTIC DATA:  White count is 5.2, hemoglobin 14.2, platelets are 188.  His BUN is

12 and creatinine 1.1 with an AST of 71, CK of 43.  Lactic acid was 1.3.  Urinalysis

is negative.  Influenza screen was done and is negative.

 

Chest x-ray reveals a left lower lobe infiltrate.

 

In summary, this is a 73-year-old man admitted with sudden onset of fever, chills,

weakness, accompanied by cough yesterday evening consistent with community-acquired

pneumonia.  Given his comorbidities of dementia and Parkinson disease, he was

admitted for further evaluation.  Would suggest we continue him on ceftriaxone and

Rocephin.  Would obtain legionella and pneumococcal urinary antigen.  This could very

well be pneumococcal disease given the sudden onset of symptoms.  Further

recommendations to follow based on his clinical course.

 

 

ALEJANDRINA VEGA M.D.

 

[id number]

DD: 04/14/2018 15:17

DT: 04/14/2018 16:28

Job #:  [id number]

## 2018-04-15 LAB
ALBUMIN SERPL-MCNC: 3.1 G/DL (ref 3.4–5)
ALP SERPL-CCNC: 70 U/L (ref 45–117)
ALT SERPL-CCNC: 43 U/L (ref 12–78)
ANION GAP SERPL CALC-SCNC: 5 MMOL/L (ref 8–16)
AST SERPL-CCNC: 55 U/L (ref 15–37)
BILIRUB SERPL-MCNC: 0.2 MG/DL (ref 0.2–1)
BUN SERPL-MCNC: 9 MG/DL (ref 7–18)
CALCIUM SERPL-MCNC: 8 MG/DL (ref 8.5–10.1)
CHLORIDE SERPL-SCNC: 109 MMOL/L (ref 98–107)
CO2 SERPL-SCNC: 27 MMOL/L (ref 21–32)
CREAT SERPL-MCNC: 0.8 MG/DL (ref 0.7–1.3)
DEPRECATED RDW RBC AUTO: 14.3 % (ref 11.9–15.9)
GLUCOSE SERPL-MCNC: 92 MG/DL (ref 74–106)
HCT VFR BLD CALC: 38.2 % (ref 35.4–49)
HGB BLD-MCNC: 13.1 GM/DL (ref 11.7–16.9)
MCH RBC QN AUTO: 30.8 PG (ref 25.7–33.7)
MCHC RBC AUTO-ENTMCNC: 34.3 G/DL (ref 32–35.9)
MCV RBC: 89.6 FL (ref 80–96)
PLATELET # BLD AUTO: 136 K/MM3 (ref 134–434)
PLATELET BLD QL SMEAR: ADEQUATE
PMV BLD: 7.6 FL (ref 7.5–11.1)
POTASSIUM SERPLBLD-SCNC: 3.5 MMOL/L (ref 3.5–5.1)
PROT SERPL-MCNC: 6.3 G/DL (ref 6.4–8.2)
RBC # BLD AUTO: 4.27 M/MM3 (ref 4–5.6)
SODIUM SERPL-SCNC: 141 MMOL/L (ref 136–145)
WBC # BLD AUTO: 3.3 K/MM3 (ref 4–10)

## 2018-04-15 RX ADMIN — HEPARIN SODIUM SCH UNIT: 5000 INJECTION, SOLUTION INTRAVENOUS; SUBCUTANEOUS at 21:14

## 2018-04-15 RX ADMIN — MEMANTINE SCH MG: 10 TABLET ORAL at 10:02

## 2018-04-15 RX ADMIN — RISPERIDONE SCH MG: 1 TABLET, FILM COATED ORAL at 21:14

## 2018-04-15 RX ADMIN — HEPARIN SODIUM SCH UNIT: 5000 INJECTION, SOLUTION INTRAVENOUS; SUBCUTANEOUS at 10:04

## 2018-04-15 RX ADMIN — RISPERIDONE SCH MG: 1 TABLET, FILM COATED ORAL at 10:02

## 2018-04-15 RX ADMIN — MEMANTINE SCH MG: 10 TABLET ORAL at 21:14

## 2018-04-15 RX ADMIN — IPRATROPIUM BROMIDE AND ALBUTEROL SULFATE SCH AMP: .5; 3 SOLUTION RESPIRATORY (INHALATION) at 20:30

## 2018-04-15 RX ADMIN — CEFTRIAXONE SCH MLS/HR: 1 INJECTION, POWDER, FOR SOLUTION INTRAMUSCULAR; INTRAVENOUS at 10:04

## 2018-04-15 RX ADMIN — IPRATROPIUM BROMIDE AND ALBUTEROL SULFATE SCH AMP: .5; 3 SOLUTION RESPIRATORY (INHALATION) at 16:15

## 2018-04-15 RX ADMIN — AZITHROMYCIN DIHYDRATE SCH MLS/HR: 500 INJECTION, POWDER, LYOPHILIZED, FOR SOLUTION INTRAVENOUS at 10:55

## 2018-04-15 NOTE — PN
Progress Note (short form)





- Note


Progress Note: 


Resting in NAD on RA. 


Family at the bedside to translate. 


Reports he says that he feels better. 





 Intake & Output











 04/12/18 04/13/18 04/14/18 04/15/18





 23:59 23:59 23:59 23:59


 


Intake Total   1200 400


 


Output Total    950


 


Balance   1200 -550


 


Weight  175 lb 163 lb 14.4 oz 








 Last Vital Signs











Temp Pulse Resp BP Pulse Ox


 


 98.8 F   68   20   149/69   97 


 


 04/15/18 10:00  04/15/18 08:57  04/15/18 08:57  04/15/18 08:57  04/14/18 20:37








Active Medications





Acetaminophen (Tylenol -)  650 mg PO Q6H PRN


   PRN Reason: PAIN OR FEVER


   Last Admin: 04/14/18 18:58 Dose:  650 mg


Heparin Sodium (Porcine) (Heparin -)  5,000 unit SQ BID [facility]


   Last Admin: 04/15/18 10:04 Dose:  5,000 unit


Azithromycin 500 mg/ Dextrose  250 mls @ 250 mls/hr IVPB DAILY [facility]


   Last Admin: 04/15/18 10:55 Dose:  250 mls/hr


Ceftriaxone Sodium 1 gm/ (Dextrose)  50 mls @ 100 mls/hr IVPB DAILY [facility]


   Last Admin: 04/15/18 10:04 Dose:  100 mls/hr


Memantine (Namenda -)  10 mg PO BID [facility]


   Last Admin: 04/15/18 10:02 Dose:  10 mg


Risperidone (Risperdal -)  1 mg PO BID [facility]


   Last Admin: 04/15/18 10:02 Dose:  1 mg











Constitutional: Yes: No Distress, Calm


Eyes: Yes: Conjunctiva Clear, EOM Intact


HENT: Yes: Atraumatic, Normocephalic, Other


Neck: Yes: Supple


Cardiovascular: Yes: Regular Rate and Rhythm


Respiratory: Yes: Cough, Diminished, Rhonchi.  No: Accessory Muscle Use, On 

Nasal O2, Rales, SOB, SOB on Exertion, Stridor, Tachypnea, Wheezes


...Inspection: Yes: WNL


...Clubbing: No


Gastrointestinal: Yes: Normal Bowel Sounds, Soft


Renal/: Yes: WNL


Breast(s): Yes: WNL


Musculoskeletal: Yes: WNL


Edema: No


Peripheral Pulses WNL: Yes


Integumentary: Yes: WNL


Neurological: Yes: Confusion


...Motor Strength: WNL


Psychiatric: Yes: Alert


Labs: 


 


 Laboratory Results - last 24 hr











  04/15/18 04/15/18





  06:35 06:35


 


WBC  3.3 L D 


 


RBC  4.27 


 


Hgb  13.1  D 


 


Hct  38.2 


 


MCV  89.6 


 


MCH  30.8 


 


MCHC  34.3 


 


RDW  14.3 


 


Plt Count  136  D 


 


MPV  7.6 


 


Total Counted  98 


 


Neutrophils %  No Result Required. 


 


Neutrophils % (Manual)  34.7 L D 


 


Band Neutrophils %  1.0 


 


Lymphocytes %  No Result Required. 


 


Lymphocytes % (Manual)  25.5  D 


 


Monocytes % (Manual)  30 H* D 


 


Eosinophils % (Manual)  0.0 


 


Basophils % (Manual)  0.0 


 


Myelocytes % (Man)  0 


 


Promyelocytes % (Man)  0 


 


Blast Cells % (Manual)  0 


 


Nucleated RBC %  0 


 


Metamyelocytes  0 


 


Platelet Estimate  Adequate 


 


Sodium   141


 


Potassium   3.5


 


Chloride   109 H


 


Carbon Dioxide   27


 


Anion Gap   5 L


 


BUN   9  D


 


Creatinine   0.8  D


 


Creat Clearance w eGFR   > 60


 


Random Glucose   92  D


 


Calcium   8.0 L


 


Total Bilirubin   0.2  D


 


AST   55 H D


 


ALT   43


 


Alkaline Phosphatase   70  D


 


Total Protein   6.3 L


 


Albumin   3.1 L D














Problem List





- Problems


(1) Dementia


Code(s): F03.90 - UNSPECIFIED DEMENTIA WITHOUT BEHAVIORAL DISTURBANCE   





(2) Elevated d-dimer


Code(s): R79.89 - OTHER SPECIFIED ABNORMAL FINDINGS OF BLOOD CHEMISTRY   





(3) Pneumonia


Code(s): J18.9 - PNEUMONIA, UNSPECIFIED ORGANISM   





(4) Sepsis


Code(s): A41.9 - SEPSIS, UNSPECIFIED ORGANISM   


Qualifiers: 


   Sepsis type: sepsis due to unspecified organism   Qualified Code(s): A41.9 - 

Sepsis, unspecified organism   





(5) Parkinson disease


Code(s): G20 - PARKINSON'S DISEASE   





Assessment/Plan


Elevated D-Dimer due to Sepsis due to LLL CAP 


Wells score 1, Diagnosis of VTE highly unlikely.  Patient is not tachycardic, 

he is comfortable on RA and not hypoxic.  Will not pursue further workup.


Follow cultures 


Agree with ABX coverage 


VTE prophylaxis 





Dr Guzman











Problem List





- Problems


(1) Dementia


Code(s): F03.90 - UNSPECIFIED DEMENTIA WITHOUT BEHAVIORAL DISTURBANCE   





(2) Elevated d-dimer


Code(s): R79.89 - OTHER SPECIFIED ABNORMAL FINDINGS OF BLOOD CHEMISTRY   





(3) Pneumonia


Code(s): J18.9 - PNEUMONIA, UNSPECIFIED ORGANISM   





(4) Sepsis


Code(s): A41.9 - SEPSIS, UNSPECIFIED ORGANISM   


Qualifiers: 


   Sepsis type: sepsis due to unspecified organism   Qualified Code(s): A41.9 - 

Sepsis, unspecified organism   





(5) Parkinson disease


Code(s): G20 - PARKINSON'S DISEASE

## 2018-04-15 NOTE — PN
Progress Note, Physician





- Current Medication List


Current Medications: 


Active Medications





Acetaminophen (Tylenol -)  650 mg PO Q6H PRN


   PRN Reason: PAIN OR FEVER


   Last Admin: 04/14/18 18:58 Dose:  650 mg


Heparin Sodium (Porcine) (Heparin -)  5,000 unit SQ BID [facility]


   Last Admin: 04/15/18 10:04 Dose:  5,000 unit


Azithromycin 500 mg/ Dextrose  250 mls @ 250 mls/hr IVPB DAILY [facility]


   Last Admin: 04/15/18 10:55 Dose:  250 mls/hr


Ceftriaxone Sodium 1 gm/ (Dextrose)  50 mls @ 100 mls/hr IVPB DAILY [facility]


   Last Admin: 04/15/18 10:04 Dose:  100 mls/hr


Memantine (Namenda -)  10 mg PO BID [facility]


   Last Admin: 04/15/18 10:02 Dose:  10 mg


Risperidone (Risperdal -)  1 mg PO BID [facility]


   Last Admin: 04/15/18 10:02 Dose:  1 mg











- Objective


Vital Signs: 


 Vital Signs











Temperature  98.8 F   04/15/18 10:00


 


Pulse Rate  68   04/15/18 08:57


 


Respiratory Rate  20   04/15/18 08:57


 


Blood Pressure  149/69   04/15/18 08:57


 


O2 Sat by Pulse Oximetry (%)  97   04/14/18 20:37











Cardiovascular: Yes: S1, S2


Respiratory: Yes: Rhonchi


Gastrointestinal: Yes: Normal Bowel Sounds, Soft


Labs: 


 CBC, BMP





 04/15/18 06:35 





 04/15/18 06:35 











Problem List





- Problems


(1) Sepsis


Assessment/Plan: 


- Likely secondary to CAP, CURB65 Score 1, Chest Xray image- KAREN Infiltrate, 

report pending, Blood Cultures-pending, Urine Legionella, no Leukocytosis, no 

Neutrophilia, LA-wnl, Monitor CBC, Monitor vitals. Azithromycin, Ceftriaxone 

will continue, 


-Appreciate ID consult





Code(s): A41.9 - SEPSIS, UNSPECIFIED ORGANISM   


Qualifiers: 


   Sepsis type: sepsis due to unspecified organism   Qualified Code(s): A41.9 - 

Sepsis, unspecified organism   





(2) Pneumonia


Assessment/Plan: 


as above


Code(s): J18.9 - PNEUMONIA, UNSPECIFIED ORGANISM   





(3) Dementia


Assessment/Plan: 





6. Psych: Dementia, Anxiety- continue home meds, fall precautions 


7. FEN- replete lytes prn, Low Na Diet


7. DVT ppx- SCDs, Consider ACs if LOS > 48 hrs





Code Status: Full Code, HCP- Sandhya AnshuHaris (daughter)





Code(s): F03.90 - UNSPECIFIED DEMENTIA WITHOUT BEHAVIORAL DISTURBANCE   





(4) Parkinson disease


Assessment/Plan: 





- continue home meds, fall precautions


Code(s): G20 - PARKINSON'S DISEASE   





(5) Elevated d-dimer


Assessment/Plan: 


- Likely secondary to Sepsis vs Wells Score 1.5, PE less likely, P 118-72 post 

fluid bolus, Tylenol, would consider CTA if P remains 100s, or complaint SOB. 

currently patient denies SOB, P 72.


-Pulm Consult noted--no work up


Code(s): R79.89 - OTHER SPECIFIED ABNORMAL FINDINGS OF BLOOD CHEMISTRY

## 2018-04-16 LAB
ALBUMIN SERPL-MCNC: 3.4 G/DL (ref 3.4–5)
ALP SERPL-CCNC: 68 U/L (ref 45–117)
ALT SERPL-CCNC: 40 U/L (ref 12–78)
ANION GAP SERPL CALC-SCNC: 10 MMOL/L (ref 8–16)
AST SERPL-CCNC: 51 U/L (ref 15–37)
BILIRUB SERPL-MCNC: 0.3 MG/DL (ref 0.2–1)
BUN SERPL-MCNC: 8 MG/DL (ref 7–18)
CALCIUM SERPL-MCNC: 8.7 MG/DL (ref 8.5–10.1)
CHLORIDE SERPL-SCNC: 106 MMOL/L (ref 98–107)
CO2 SERPL-SCNC: 26 MMOL/L (ref 21–32)
CREAT SERPL-MCNC: 0.8 MG/DL (ref 0.7–1.3)
DEPRECATED RDW RBC AUTO: 14.2 % (ref 11.9–15.9)
GLUCOSE SERPL-MCNC: 89 MG/DL (ref 74–106)
HCT VFR BLD CALC: 42 % (ref 35.4–49)
HGB BLD-MCNC: 14.3 GM/DL (ref 11.7–16.9)
MCH RBC QN AUTO: 30.6 PG (ref 25.7–33.7)
MCHC RBC AUTO-ENTMCNC: 34.1 G/DL (ref 32–35.9)
MCV RBC: 89.7 FL (ref 80–96)
PLATELET # BLD AUTO: 142 K/MM3 (ref 134–434)
PLATELET BLD QL SMEAR: (no result)
PMV BLD: 7.6 FL (ref 7.5–11.1)
POTASSIUM SERPLBLD-SCNC: 4 MMOL/L (ref 3.5–5.1)
PROT SERPL-MCNC: 6.9 G/DL (ref 6.4–8.2)
RBC # BLD AUTO: 4.69 M/MM3 (ref 4–5.6)
SODIUM SERPL-SCNC: 142 MMOL/L (ref 136–145)
WBC # BLD AUTO: 3.1 K/MM3 (ref 4–10)

## 2018-04-16 RX ADMIN — CEFUROXIME AXETIL SCH MG: 500 TABLET ORAL at 21:17

## 2018-04-16 RX ADMIN — MEMANTINE SCH MG: 10 TABLET ORAL at 21:16

## 2018-04-16 RX ADMIN — AZITHROMYCIN DIHYDRATE SCH MLS/HR: 500 INJECTION, POWDER, LYOPHILIZED, FOR SOLUTION INTRAVENOUS at 11:32

## 2018-04-16 RX ADMIN — CEFUROXIME AXETIL SCH: 500 TABLET ORAL at 14:30

## 2018-04-16 RX ADMIN — IPRATROPIUM BROMIDE AND ALBUTEROL SULFATE SCH AMP: .5; 3 SOLUTION RESPIRATORY (INHALATION) at 15:12

## 2018-04-16 RX ADMIN — RISPERIDONE SCH MG: 1 TABLET, FILM COATED ORAL at 21:16

## 2018-04-16 RX ADMIN — IPRATROPIUM BROMIDE AND ALBUTEROL SULFATE SCH: .5; 3 SOLUTION RESPIRATORY (INHALATION) at 11:05

## 2018-04-16 RX ADMIN — MEMANTINE SCH MG: 10 TABLET ORAL at 10:04

## 2018-04-16 RX ADMIN — CEFTRIAXONE SCH MLS/HR: 1 INJECTION, POWDER, FOR SOLUTION INTRAMUSCULAR; INTRAVENOUS at 10:03

## 2018-04-16 RX ADMIN — HEPARIN SODIUM SCH UNIT: 5000 INJECTION, SOLUTION INTRAVENOUS; SUBCUTANEOUS at 10:04

## 2018-04-16 RX ADMIN — IPRATROPIUM BROMIDE AND ALBUTEROL SULFATE SCH AMP: .5; 3 SOLUTION RESPIRATORY (INHALATION) at 20:13

## 2018-04-16 RX ADMIN — RISPERIDONE SCH MG: 1 TABLET, FILM COATED ORAL at 10:04

## 2018-04-16 RX ADMIN — HEPARIN SODIUM SCH UNIT: 5000 INJECTION, SOLUTION INTRAVENOUS; SUBCUTANEOUS at 21:16

## 2018-04-16 RX ADMIN — IPRATROPIUM BROMIDE AND ALBUTEROL SULFATE SCH AMP: .5; 3 SOLUTION RESPIRATORY (INHALATION) at 07:25

## 2018-04-16 NOTE — DS
Physical Examination


Vital Signs: 


 Vital Signs











Temperature  98.7 F   04/16/18 08:26


 


Pulse Rate  56 L  04/16/18 08:26


 


Respiratory Rate  20   04/16/18 08:26


 


Blood Pressure  144/80   04/16/18 08:26


 


O2 Sat by Pulse Oximetry (%)  96   04/15/18 20:21











Constitutional: Yes: Calm


Neck: Yes: Trachea Midline


Cardiovascular: Yes: Regular Rate and Rhythm, S1, S2


Respiratory: Yes: CTA Bilaterally


Gastrointestinal: Yes: Normal Bowel Sounds, Soft


Edema: No


Neurological: Yes: Alert, Oriented


Labs: 


 CBC, BMP





 04/16/18 07:30 





 04/16/18 07:30 











Discharge Summary


Reason For Visit: SEPSIS,PNEUMONIA


Current Active Problems





DVT prophylaxis (Acute)


Dementia (Acute)


Elevated d-dimer (Acute)


Pneumonia (Acute)


Sepsis (Acute)








Hospital Course: 





CHIEF COMPLAINT: Fever, Chills





PCP: Dr. Stefany Dixon





HISTORY OF PRESENT ILLNESS:


This is a 72 y/o man with a PMH of Dementia, Parkinson's, Hypertension, 

Anxiety. Who presents to the ED with fever, chills, rigors x 4pm yesterday. 

Patient has Dementia, unable to obtain HPI. Patient's daughter was at bedside, 

 used. The daughter reports the patient was shaking uncontrollably 

and he felt hot.


The patient denies cough, dizziness, HA, SOB, CP, N/V/D, constipation, dysuria.





ER course was notable for:


(1) Sepsis criteria met: T 103, P 118, R 22


(2) Chest Xray image: KAREN Pneumonia


(3)





Recent Travel: None








hospital course:


iv rocephin,zithromax WBC low 3.1 switch to ceftin


chest CT  mild left lower lobe opacity


Condition: Improved





- Instructions


Diet, Activity, Other Instructions: 


ceftin 500mg po bid for 5 days


Referrals: 


Stefany Dixon [Primary Care Provider] - 


Disposition: HOME





- Home Medications


Comprehensive Discharge Medication List: 


Ambulatory Orders





Memantine HCl [Namenda -] 10 mg PO BID #60 tablet 12/19/17 


Risperidone [Risperdal -] 1 mg PO BID #60 tablet 12/19/17

## 2018-04-16 NOTE — PN
Progress Note (short form)





- Note


Progress Note: 





PULMONARY





Denies shortness of breath, cough or fevers. Family at bedside.





 Last Vital Signs











Temp Pulse Resp BP Pulse Ox


 


 98.7 F   56 L  20   144/80   96 


 


 04/16/18 08:26  04/16/18 08:26  04/16/18 08:26  04/16/18 08:26  04/15/18 20:21








Gen:  NAD at rest


Heart: RRR


Lung: decreased breath sounds at the bases


Abd: soft, nontender


Ext: no edema





 CBC, BMP





 04/16/18 07:30 





 04/16/18 07:30 





Active Medications





Acetaminophen (Tylenol -)  650 mg PO Q6H PRN


   PRN Reason: PAIN OR FEVER


   Last Admin: 04/14/18 18:58 Dose:  650 mg


Albuterol/Ipratropium (Duoneb -)  1 amp NEB RQID [facility]


   Last Admin: 04/16/18 11:05 Dose:  Not Given


Heparin Sodium (Porcine) (Heparin -)  5,000 unit SQ BID [facility]


   Last Admin: 04/16/18 10:04 Dose:  5,000 unit


Azithromycin 500 mg/ Dextrose  250 mls @ 250 mls/hr IVPB DAILY [facility]


   Last Admin: 04/16/18 11:32 Dose:  250 mls/hr


Ceftriaxone Sodium 1 gm/ (Dextrose)  50 mls @ 100 mls/hr IVPB DAILY [facility]


   Last Admin: 04/16/18 10:03 Dose:  100 mls/hr


Memantine (Namenda -)  10 mg PO BID [facility]


   Last Admin: 04/16/18 10:04 Dose:  10 mg


Risperidone (Risperdal -)  1 mg PO BID [facility]


   Last Admin: 04/16/18 10:04 Dose:  1 mg





A/P


Pneumonia


Sepsis


HTN


Parkinsons


Dementia





-  continue antibiotics


-  inhaled bronchodilators as needed


-  DVT prophylaxis

## 2018-04-16 NOTE — PN
Progress Note (short form)





- Note


Progress Note: 





doing well


just returned from chest ct


eating lunch


 Vital Signs











 Period  Temp  Pulse  Resp  BP Sys/Chowdhury  Pulse Ox


 


 Last 24 Hr  98.6 F-99.4 F  56-66  20-22  119-144/72-80  96








cor-rrr


lungs decreased bs at bases


abd soft,nt


ext no edema





 CBC, BMP





 04/16/18 07:30 





 04/16/18 07:30 





 Microbiology





04/14/18 01:05   Blood - Peripheral Venous   Blood Culture - Preliminary


                            NO GROWTH OBTAINED AFTER 48 HOURS, INCUBATION TO 

CONTINUE


                            FOR 3 DAYS.


04/14/18 01:05   Blood - Peripheral Venous   Blood Culture - Preliminary


                            NO GROWTH OBTAINED AFTER 48 HOURS, INCUBATION TO 

CONTINUE


                            FOR 3 DAYS.


04/14/18 05:45   Urine - Urine Clean Catch   Urine Culture - Final


                            NO GROWTH OBTAINED


04/14/18 15:15   Urine For Antigen Detection   Legionella Antigen - Preliminary


04/14/18 15:15   Urine For Antigen Detection   Streptococcus pneumoniae Antigen 

(M - Final


04/14/18 01:25   Nasopharyngeal Swab   Influenza Types A,B Antigen (LETICIA) - Final


04/14/18 01:25   Nasopharyngeal Swab    - Final











imp/reccd





CAP- LLL infiltrate- f/u chest ct, ?viral with leukopenia





influenza screen negative


cultures sent





continue rocephin/zith day #3


?discharge home in am on po ceftin














Problem List





- Problems


(1) Pneumonia


Code(s): J18.9 - PNEUMONIA, UNSPECIFIED ORGANISM

## 2018-04-16 NOTE — EKG
Test Reason : 

Blood Pressure : ***/*** mmHG

Vent. Rate : 103 BPM     Atrial Rate : 103 BPM

   P-R Int : 130 ms          QRS Dur : 096 ms

    QT Int : 340 ms       P-R-T Axes : 049 -25 033 degrees

   QTc Int : 445 ms

 

SINUS TACHYCARDIA WITH PREMATURE ATRIAL COMPLEXES

OTHERWISE NORMAL ECG

WHEN COMPARED WITH ECG OF 18-DEC-2017 20:31,

VENT. RATE HAS INCREASED BY  49 BPM

Confirmed by CUCO GUERRIER MD ([id number]) on 4/16/2018 12:52:44 PM

 

Referred By:             Confirmed By:CUCO GUERRIER MD

## 2018-04-17 VITALS — HEART RATE: 78 BPM | DIASTOLIC BLOOD PRESSURE: 98 MMHG | TEMPERATURE: 97.9 F | SYSTOLIC BLOOD PRESSURE: 140 MMHG

## 2018-04-17 RX ADMIN — CEFUROXIME AXETIL SCH MG: 500 TABLET ORAL at 10:25

## 2018-04-17 RX ADMIN — HEPARIN SODIUM SCH UNIT: 5000 INJECTION, SOLUTION INTRAVENOUS; SUBCUTANEOUS at 10:25

## 2018-04-17 RX ADMIN — IPRATROPIUM BROMIDE AND ALBUTEROL SULFATE SCH AMP: .5; 3 SOLUTION RESPIRATORY (INHALATION) at 07:20

## 2018-04-17 RX ADMIN — MEMANTINE SCH MG: 10 TABLET ORAL at 10:25

## 2018-04-17 RX ADMIN — IPRATROPIUM BROMIDE AND ALBUTEROL SULFATE SCH: .5; 3 SOLUTION RESPIRATORY (INHALATION) at 11:57

## 2018-04-17 RX ADMIN — RISPERIDONE SCH MG: 1 TABLET, FILM COATED ORAL at 10:25

## 2018-04-17 NOTE — PN
Progress Note (short form)





- Note


Progress Note: 





PULMONARY





Feels well. Denies shortness of breath, cough or fevers. 





 Last Vital Signs











Temp Pulse Resp BP Pulse Ox


 


 98.6 F   66   20   129/76   96 


 


 04/17/18 08:00  04/17/18 08:00  04/17/18 08:00  04/17/18 08:00  04/17/18 08:00











Gen:  NAD at rest


Heart: RRR


Lung: decreased breath sounds at the bases


Abd: soft, nontender


Ext: no edema





 CBC, BMP





 04/16/18 07:30 





 04/16/18 07:30 





Active Medications





Acetaminophen (Tylenol -)  650 mg PO Q6H PRN


   PRN Reason: PAIN OR FEVER


   Last Admin: 04/14/18 18:58 Dose:  650 mg


Albuterol/Ipratropium (Duoneb -)  1 amp NEB RQID [facility]


   Last Admin: 04/17/18 07:20 Dose:  1 amp


Cefuroxime Axetil (Ceftin -)  500 mg PO BID [facility]


   Last Admin: 04/17/18 10:25 Dose:  500 mg


Heparin Sodium (Porcine) (Heparin -)  5,000 unit SQ BID [facility]


   Last Admin: 04/17/18 10:25 Dose:  5,000 unit


Memantine (Namenda -)  10 mg PO BID [facility]


   Last Admin: 04/17/18 10:25 Dose:  10 mg


Risperidone (Risperdal -)  1 mg PO BID [facility]


   Last Admin: 04/17/18 10:25 Dose:  1 mg








A/P


Pneumonia


Sepsis


HTN


Parkinsons


Dementia





-  complete antibiotic course


-  inhaled bronchodilators as needed


-  DVT prophylaxis


-  d/c planning

## 2018-04-17 NOTE — PN
Progress Note, Physician


Chief Complaint: 





patient seen and examined


ready to go home today





- Current Medication List


Current Medications: 


Active Medications





Acetaminophen (Tylenol -)  650 mg PO Q6H PRN


   PRN Reason: PAIN OR FEVER


   Last Admin: 04/14/18 18:58 Dose:  650 mg


Albuterol/Ipratropium (Duoneb -)  1 amp NEB RQID [facility]


   Last Admin: 04/17/18 07:20 Dose:  1 amp


Cefuroxime Axetil (Ceftin -)  500 mg PO BID [facility]


   Last Admin: 04/17/18 10:25 Dose:  500 mg


Heparin Sodium (Porcine) (Heparin -)  5,000 unit SQ BID [facility]


   Last Admin: 04/17/18 10:25 Dose:  5,000 unit


Memantine (Namenda -)  10 mg PO BID [facility]


   Last Admin: 04/17/18 10:25 Dose:  10 mg


Risperidone (Risperdal -)  1 mg PO BID [facility]


   Last Admin: 04/17/18 10:25 Dose:  1 mg











- Objective


Vital Signs: 


 Vital Signs











Temperature  98.3 F   04/17/18 05:30


 


Pulse Rate  64   04/17/18 05:30


 


Respiratory Rate  18   04/17/18 05:30


 


Blood Pressure  120/67   04/17/18 05:30


 


O2 Sat by Pulse Oximetry (%)  96   04/15/18 20:21











Constitutional: Yes: Calm


Neck: Yes: Trachea Midline


Cardiovascular: Yes: Regular Rate and Rhythm, S1, S2


Respiratory: Yes: CTA Bilaterally


Gastrointestinal: Yes: Normal Bowel Sounds, Soft


Edema: No


Neurological: Yes: Alert, Oriented


Labs: 


 CBC, BMP





 04/16/18 07:30 





 04/16/18 07:30 











Problem List





- Problems


(1) Dementia


Assessment/Plan: 


namenda


Code(s): F03.90 - UNSPECIFIED DEMENTIA WITHOUT BEHAVIORAL DISTURBANCE   





(2) Pneumonia


Assessment/Plan: 


ceftin for 5 days


neublizer


Code(s): J18.9 - PNEUMONIA, UNSPECIFIED ORGANISM   





Assessment/Plan





dc home today